# Patient Record
Sex: MALE | Race: BLACK OR AFRICAN AMERICAN | NOT HISPANIC OR LATINO | URBAN - METROPOLITAN AREA
[De-identification: names, ages, dates, MRNs, and addresses within clinical notes are randomized per-mention and may not be internally consistent; named-entity substitution may affect disease eponyms.]

---

## 2017-04-05 ENCOUNTER — EMERGENCY (EMERGENCY)
Facility: HOSPITAL | Age: 37
LOS: 1 days | Discharge: HOME | End: 2017-04-05
Attending: STUDENT IN AN ORGANIZED HEALTH CARE EDUCATION/TRAINING PROGRAM | Admitting: STUDENT IN AN ORGANIZED HEALTH CARE EDUCATION/TRAINING PROGRAM
Payer: COMMERCIAL

## 2017-04-05 VITALS
SYSTOLIC BLOOD PRESSURE: 132 MMHG | OXYGEN SATURATION: 98 % | RESPIRATION RATE: 14 BRPM | HEART RATE: 68 BPM | DIASTOLIC BLOOD PRESSURE: 61 MMHG | TEMPERATURE: 98 F

## 2017-04-05 VITALS
WEIGHT: 184.97 LBS | HEART RATE: 76 BPM | SYSTOLIC BLOOD PRESSURE: 125 MMHG | DIASTOLIC BLOOD PRESSURE: 73 MMHG | OXYGEN SATURATION: 98 % | RESPIRATION RATE: 16 BRPM | HEIGHT: 76 IN | TEMPERATURE: 98 F

## 2017-04-05 DIAGNOSIS — M54.2 CERVICALGIA: ICD-10-CM
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
## 2017-04-05 DIAGNOSIS — M25.511 PAIN IN RIGHT SHOULDER: ICD-10-CM

## 2017-04-05 DIAGNOSIS — F17.210 NICOTINE DEPENDENCE, CIGARETTES, UNCOMPLICATED: ICD-10-CM

## 2017-04-05 DIAGNOSIS — Z98.890 OTHER SPECIFIED POSTPROCEDURAL STATES: Chronic | ICD-10-CM

## 2017-04-05 DIAGNOSIS — M79.651 PAIN IN RIGHT THIGH: ICD-10-CM

## 2017-04-05 PROCEDURE — 72040 X-RAY EXAM NECK SPINE 2-3 VW: CPT | Mod: 26

## 2017-04-05 PROCEDURE — 73552 X-RAY EXAM OF FEMUR 2/>: CPT | Mod: 26,RT

## 2017-04-05 PROCEDURE — 73030 X-RAY EXAM OF SHOULDER: CPT | Mod: 26,RT

## 2017-04-05 PROCEDURE — 73030 X-RAY EXAM OF SHOULDER: CPT

## 2017-04-05 PROCEDURE — 99284 EMERGENCY DEPT VISIT MOD MDM: CPT | Mod: 25

## 2017-04-05 PROCEDURE — 72040 X-RAY EXAM NECK SPINE 2-3 VW: CPT

## 2017-04-05 PROCEDURE — 73552 X-RAY EXAM OF FEMUR 2/>: CPT

## 2017-04-05 RX ORDER — IBUPROFEN 200 MG
600 TABLET ORAL ONCE
Qty: 0 | Refills: 0 | Status: COMPLETED | OUTPATIENT
Start: 2017-04-05 | End: 2017-04-05

## 2017-04-05 RX ADMIN — Medication 600 MILLIGRAM(S): at 02:52

## 2017-04-05 RX ADMIN — Medication 600 MILLIGRAM(S): at 03:37
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
## 2017-04-05 NOTE — ED PROVIDER NOTE - PHYSICAL EXAMINATION
Gait steady, no bruising, deformity to right thigh. Full ROM of right knee, no effusion. Hips and pelvis stable. Full ROM of right shoulder with no tenderness to palpation, swelling, or deformity. Normal rotator cuff function, normal sensation.

## 2017-04-05 NOTE — ED PROVIDER NOTE - PROGRESS NOTE DETAILS
Patient feels well, informed of results of x-rays. Advised rest, NSAIDS, PMD f/u. consider MRI if symptoms persist. Patient expressed understanding. S.O. at bedside to take patient home.

## 2017-04-05 NOTE — ED PROVIDER NOTE - HEAD, MLM
Head is atraumatic. Head shape is symmetrical. Full ROM of c-spine, no deformity, ecchymosis, step-off

## 2017-06-08 ENCOUNTER — EMERGENCY (EMERGENCY)
Facility: HOSPITAL | Age: 37
LOS: 1 days | Discharge: HOME | End: 2017-06-08
Attending: EMERGENCY MEDICINE | Admitting: EMERGENCY MEDICINE
Payer: MEDICAID

## 2017-06-08 VITALS
HEIGHT: 76 IN | SYSTOLIC BLOOD PRESSURE: 131 MMHG | HEART RATE: 67 BPM | RESPIRATION RATE: 16 BRPM | WEIGHT: 186.95 LBS | DIASTOLIC BLOOD PRESSURE: 91 MMHG | OXYGEN SATURATION: 96 % | TEMPERATURE: 98 F

## 2017-06-08 DIAGNOSIS — Z98.890 OTHER SPECIFIED POSTPROCEDURAL STATES: Chronic | ICD-10-CM

## 2017-06-08 DIAGNOSIS — R11.10 VOMITING, UNSPECIFIED: ICD-10-CM

## 2017-06-08 DIAGNOSIS — Z98.890 OTHER SPECIFIED POSTPROCEDURAL STATES: ICD-10-CM

## 2017-06-08 DIAGNOSIS — F17.210 NICOTINE DEPENDENCE, CIGARETTES, UNCOMPLICATED: ICD-10-CM

## 2017-06-08 PROCEDURE — 96372 THER/PROPH/DIAG INJ SC/IM: CPT

## 2017-06-08 PROCEDURE — 99053 MED SERV 10PM-8AM 24 HR FAC: CPT

## 2017-06-08 PROCEDURE — 99284 EMERGENCY DEPT VISIT MOD MDM: CPT | Mod: 25

## 2017-06-08 RX ORDER — ONDANSETRON 8 MG/1
1 TABLET, FILM COATED ORAL
Qty: 15 | Refills: 0
Start: 2017-06-08 | End: 2017-06-13

## 2017-06-08 RX ORDER — MORPHINE SULFATE 50 MG/1
4 CAPSULE, EXTENDED RELEASE ORAL ONCE
Qty: 0 | Refills: 0 | Status: DISCONTINUED | OUTPATIENT
Start: 2017-06-08 | End: 2017-06-08

## 2017-06-08 RX ORDER — ONDANSETRON 8 MG/1
4 TABLET, FILM COATED ORAL ONCE
Qty: 0 | Refills: 0 | Status: COMPLETED | OUTPATIENT
Start: 2017-06-08 | End: 2017-06-08

## 2017-06-08 RX ADMIN — MORPHINE SULFATE 4 MILLIGRAM(S): 50 CAPSULE, EXTENDED RELEASE ORAL at 04:39

## 2017-06-08 RX ADMIN — ONDANSETRON 4 MILLIGRAM(S): 8 TABLET, FILM COATED ORAL at 04:38

## 2017-06-08 RX ADMIN — MORPHINE SULFATE 4 MILLIGRAM(S): 50 CAPSULE, EXTENDED RELEASE ORAL at 05:14

## 2017-06-08 NOTE — ED ADULT NURSE NOTE - OBJECTIVE STATEMENT
Pt is post-op shoulder surgery 12 hrs, c/o shoulder pain and nausea r/t pain medications. Pt states pain medication not strong enough.

## 2017-06-08 NOTE — ED PROVIDER NOTE - OBJECTIVE STATEMENT
38yo male who presents with nausea and vomiting after taking percocet for pain. pt had rotator cuff surgery today and was sent home with percocet but no nausea medication, and is not able to control his pain, no tingling or numbness, no weakness, no fever

## 2017-06-26 NOTE — ED PROVIDER NOTE - CARE PLAN
Principal Discharge DX:	Vomiting in adult patient 67yo Male with large right upper thigh mass, scheduled for excision of mass/cyst.

## 2021-01-05 ENCOUNTER — TRANSCRIPTION ENCOUNTER (OUTPATIENT)
Age: 41
End: 2021-01-05

## 2021-02-12 ENCOUNTER — TRANSCRIPTION ENCOUNTER (OUTPATIENT)
Age: 41
End: 2021-02-12

## 2022-02-11 ENCOUNTER — EMERGENCY (EMERGENCY)
Facility: HOSPITAL | Age: 42
LOS: 1 days | Discharge: HOME | End: 2022-02-11
Attending: EMERGENCY MEDICINE | Admitting: EMERGENCY MEDICINE
Payer: COMMERCIAL

## 2022-02-11 VITALS
WEIGHT: 188.94 LBS | OXYGEN SATURATION: 98 % | HEART RATE: 89 BPM | RESPIRATION RATE: 16 BRPM | DIASTOLIC BLOOD PRESSURE: 58 MMHG | SYSTOLIC BLOOD PRESSURE: 110 MMHG | HEIGHT: 76 IN | TEMPERATURE: 97 F

## 2022-02-11 DIAGNOSIS — Z98.890 OTHER SPECIFIED POSTPROCEDURAL STATES: Chronic | ICD-10-CM

## 2022-02-11 PROCEDURE — 99283 EMERGENCY DEPT VISIT LOW MDM: CPT | Mod: 25

## 2022-02-11 PROCEDURE — 29515 APPLICATION SHORT LEG SPLINT: CPT

## 2022-02-11 PROCEDURE — 99053 MED SERV 10PM-8AM 24 HR FAC: CPT

## 2022-02-11 PROCEDURE — 29515 APPLICATION SHORT LEG SPLINT: CPT | Mod: RT

## 2022-02-11 PROCEDURE — 73630 X-RAY EXAM OF FOOT: CPT

## 2022-02-11 RX ORDER — ACETAMINOPHEN 500 MG
650 TABLET ORAL ONCE
Refills: 0 | Status: COMPLETED | OUTPATIENT
Start: 2022-02-11 | End: 2022-02-11

## 2022-02-11 RX ADMIN — Medication 650 MILLIGRAM(S): at 23:46
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
## 2022-02-11 NOTE — ED PROVIDER NOTE - CARE PROVIDER_API CALL
Nile Lemus ([facility]WELL)  Podiatric Medicine and Surgery  [address] [place] [address]  Phone: [phone]  Fax: [phone]  Follow Up Time:

## 2022-02-11 NOTE — ED PROVIDER NOTE - NSICDXPASTSURGICALHX_GEN_ALL_CORE_FT
PAST SURGICAL HISTORY:  H/O shoulder surgery right    History of back surgery     S/P ACL surgery right

## 2022-02-11 NOTE — ED PROVIDER NOTE - NSFOLLOWUPINSTRUCTIONS_ED_ALL_ED_FT
Crush Injury of the Foot      When a crush injury of the foot occurs, many structures within the foot and ankle can be affected. This can result in a complicated injury that may involve:  •One or more broken (fractured) bones.      •Lacerations or abrasions of the skin. These increase your risk of infection.      •Compressed or torn muscles.      •Torn ligaments and tendons.      •Broken blood vessels, causing bleeding within the tissues. This can lead to dangerously high pressure within the tissues (compartment syndrome).      •Damage to nerves.      •One or more toe amputations.        What are the causes?    This type of injury can happen when a great amount of force is suddenly applied to the foot. This might occur:  •During a motor vehicle accident.      •If a heavy load falls directly onto the foot.      •If the foot is pulled into a machine during industrial or agricultural work.        What are the signs or symptoms?    Symptoms will vary depending on which structures in your foot have been injured. Symptoms may include:  •Moderate or severe pain in the foot, ankle, or leg.      •Bleeding at the site of injury.      •Tingling, numbness, or loss of feeling (sensation) in part or all of your foot.      •Loss of movement in part or all of your foot.        How is this diagnosed?    Your health care provider will examine you and ask questions about how your injury happened. The exam may include checking for sensation and blood flow into your foot. You may also have tests, including X-rays and procedures to check the pressure in your foot.    After initial treatment, additional tests may be done to further diagnose the extent of your injuries. These may include:  •A nerve conduction study to determine how well the nerves are working in your leg and foot.      •An MRI to determine if other injuries occurred that do not usually show up on the X-ray.        How is this treated?    Treatment for this condition depends on the severity of your crush injury. Treatment may include:  •Thorough cleaning if you have an open wound. This may or may not require surgery.      •Having a splint applied to your foot or leg.      •Medicine to relieve pain.      •Antibiotic medicine to prevent infection.      •Stitches (sutures) to close open wounds.      •One or more surgeries to address injuries to skin, bones, joints, tendons, ligaments, muscles, nerves, or blood vessels.        Follow these instructions at home:    If you have a splint:     •Wear the splint as told by your health care provider. Remove it only as told by your health care provider.      •Loosen the splint if your toes tingle, become numb, or turn cold and blue.      •Keep the splint clean.    •If the splint is not waterproof:  •Do not let it get wet.      •Cover it with a watertight covering when you take a bath or shower.          Wound care    •If you have any skin wounds that were covered with bandages (dressings), follow instructions from your health care provider about how to take care of your wound. Make sure you:  •Wash your hands with soap and water before and after you change your dressing. If soap and water are not available, use hand .      •Change your dressing as told by your health care provider.      •Leave stitches (sutures), skin glue, or adhesive strips in place. These skin closures may need to stay in place for 2 weeks or longer. If adhesive strip edges start to loosen and curl up, you may trim the loose edges. Do not remove adhesive strips completely unless your health care provider tells you to do that.      •If you have skin wounds, check them every day for signs of infection. Check for:  •Redness, swelling, or pain.      •Fluid or blood.      •Warmth.      •Pus or a bad smell.          Managing pain, stiffness, and swelling    •If directed, put ice on the injured area.  •Put ice in a plastic bag.      •Place a towel between your skin and the bag.      •Leave the ice on for 20 minutes, 2–3 times a day.        •Raise (elevate) the injured area above the level of your heart while you are sitting or lying down.      Driving     • Do not drive or use heavy machinery while taking prescription pain medicine.      •Ask your health care provider when it is safe to drive if you have a splint on your foot or leg.      Activity     •Return to your normal activities as told by your health care provider. Ask your health care provider what activities are safe for you.      •Work with a physical therapist (PT) or occupational therapist (OT) as told by your health care provider.      General instructions     •Take over-the-counter and prescription medicines only as told by your health care provider.      •If you were prescribed an antibiotic, take it as told by your health care provider. Do not stop taking the antibiotic even if you start to feel better.      • Do not use any products that contain nicotine or tobacco, such as cigarettes, e-cigarettes, and chewing tobacco. These can delay healing. If you need help quitting, ask your health care provider.      •Keep all follow-up visits as told by your health care provider. This is important. These include PT and OT visits.        Contact a health care provider if:    •A wound that was sutured opens up.      •You have redness, swelling, or pain in your foot.      •You have fluid or blood coming from your foot.      •Your foot feels warm to the touch.      •You have pus or a bad smell coming from your foot.      •You have a fever.        Get help right away if:    •You suddenly develop severe pain in your foot.      •You previously had sensation in your foot and you suddenly lose sensation.      •Your symptoms had improved and they suddenly get worse.      •Your foot or toes are turning pink or blue.        Summary    •When a crush injury of the foot occurs, many structures within the foot and ankle can be affected.      •This type of injury can happen when a great amount of force is suddenly applied to the foot.      •Treatment for this condition depends on the severity of your crush injury.      This information is not intended to replace advice given to you by your health care provider. Make sure you discuss any questions you have with your health care provider
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
## 2022-02-11 NOTE — ED PROVIDER NOTE - PATIENT PORTAL LINK FT
You can access the FollowMyHealth Patient Portal offered by [facility] by registering at the following website: [email or web address]. By joining [facility]’s FollowMyHealth portal, you will also be able to view your health information using other applications (apps) compatible with our system.
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
## 2022-02-12 PROCEDURE — 73630 X-RAY EXAM OF FOOT: CPT | Mod: 26,RT

## 2022-06-01 ENCOUNTER — EMERGENCY (EMERGENCY)
Facility: HOSPITAL | Age: 42
LOS: 1 days | Discharge: HOME | End: 2022-06-01
Attending: EMERGENCY MEDICINE | Admitting: EMERGENCY MEDICINE
Payer: COMMERCIAL

## 2022-06-01 VITALS
WEIGHT: 186.95 LBS | SYSTOLIC BLOOD PRESSURE: 112 MMHG | HEIGHT: 76 IN | HEART RATE: 75 BPM | TEMPERATURE: 96 F | DIASTOLIC BLOOD PRESSURE: 68 MMHG | RESPIRATION RATE: 18 BRPM | OXYGEN SATURATION: 98 %

## 2022-06-01 DIAGNOSIS — Z98.890 OTHER SPECIFIED POSTPROCEDURAL STATES: Chronic | ICD-10-CM

## 2022-06-01 PROCEDURE — 99285 EMERGENCY DEPT VISIT HI MDM: CPT

## 2022-06-01 PROCEDURE — 73562 X-RAY EXAM OF KNEE 3: CPT | Mod: 26,RT

## 2022-06-01 PROCEDURE — 70450 CT HEAD/BRAIN W/O DYE: CPT | Mod: MF

## 2022-06-01 PROCEDURE — 72110 X-RAY EXAM L-2 SPINE 4/>VWS: CPT

## 2022-06-01 PROCEDURE — 72110 X-RAY EXAM L-2 SPINE 4/>VWS: CPT | Mod: 26

## 2022-06-01 PROCEDURE — 99284 EMERGENCY DEPT VISIT MOD MDM: CPT | Mod: 25

## 2022-06-01 PROCEDURE — 70450 CT HEAD/BRAIN W/O DYE: CPT | Mod: 26,MF

## 2022-06-01 PROCEDURE — G1004: CPT

## 2022-06-01 PROCEDURE — 73562 X-RAY EXAM OF KNEE 3: CPT

## 2022-06-01 RX ORDER — ACETAMINOPHEN 500 MG
1000 TABLET ORAL ONCE
Refills: 0 | Status: COMPLETED | OUTPATIENT
Start: 2022-06-01 | End: 2022-06-01

## 2022-06-01 RX ADMIN — Medication 1000 MILLIGRAM(S): at 14:09

## 2022-06-01 NOTE — ED PROVIDER NOTE - NEUROLOGICAL, MLM
Alert and oriented, no focal deficits, no motor or sensory deficits. CN II-XII intact. neurovascular intact.

## 2022-06-01 NOTE — ED PROVIDER NOTE - NS ED ATTENDING STATEMENT MOD
This was a shared visit with the COSMO. I reviewed and verified the documentation and independently performed the documented:
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
## 2022-06-01 NOTE — ED PROVIDER NOTE - PATIENT PORTAL LINK FT
You can access the FollowMyHealth Patient Portal offered by [facility] by registering at the following website: [email or web address]. By joining [facility]’s FollowMyHealth portal, you will also be able to view your health information using other applications (apps) compatible with our system.
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
## 2022-06-01 NOTE — ED ADULT NURSE NOTE - BREATH SOUNDS, MLM
Clear Rotation Flap Text: The defect edges were debeveled with a #15 scalpel blade.  Given the location of the defect, shape of the defect and the proximity to free margins a rotation flap was deemed most appropriate.  Using a sterile surgical marker, an appropriate rotation flap was drawn incorporating the defect and placing the expected incisions within the relaxed skin tension lines where possible.    The area thus outlined was incised deep to adipose tissue with a #15 scalpel blade.  The skin margins were undermined to an appropriate distance in all directions utilizing iris scissors.

## 2022-06-01 NOTE — ED PROVIDER NOTE - MUSCULOSKELETAL MINIMAL EXAM
+ TTP right medial knee with ROM. + TTP right upper trapezius and paracervical muscles. + TTP right lumbar paraspinal muscles. no midline tendenress. FROM of all extremities. no chest wall or pelvic tenderness. dp pulses equal and intact bilaterally.

## 2022-06-01 NOTE — ED PROVIDER NOTE - CARE PROVIDER_API CALL
Sabino Mcarthur)  Orthopaedic Surgery  [address] [place] [address]  Phone: [phone]  Fax: [phone]  Follow Up Time: 1-3 Days
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
## 2022-06-01 NOTE — ED ADULT NURSE NOTE - BOWEL SOUNDS RUQ
Spoke with patient. At her visit on 18 with Trinh Warren she was increased to 4 tablets daily. 1 tablet in the morning, 2 at 4pm and 1 tablet at midnight.     Reviewed PDMP: Consistent    Per Trinh Warren OV 18:  Patient Instructions   Start today at 4 PM and take 2 of the long acting Morphine SR 15 mg  Continue taking one at 8 AM and 1 at Midnight.       Oxycodone w/ Acetaminophen  5-325MG / Tablet  Rx# [id number] Opioid Qty: 82  Days: 20  Refills: 0 Prescribed: 2018  Dispensed: [address], [id number] [id number]  [address] [facility] [address] [address]  : 1978 [address], [id number]  Pay Type: Medicare Morphine Sulfate  15MG / Tablet Extended Release  Rx# [id number] Opioid Qty: 84  Days: 28  Refills: 0 Prescribed: 2018  Dispensed: [address], [id number] [id number]  [address] [facility] [address] [address]  : 1978 [address], [id number]  Pay Type: Medicare     present

## 2023-01-23 ENCOUNTER — NON-APPOINTMENT (OUTPATIENT)
Age: 43
End: 2023-01-23

## 2023-01-23 ENCOUNTER — APPOINTMENT (OUTPATIENT)
Dept: ORTHOPEDIC SURGERY | Facility: CLINIC | Age: 43
End: 2023-01-23
Payer: MEDICAID

## 2023-01-23 DIAGNOSIS — S97.122A: ICD-10-CM

## 2023-01-23 PROCEDURE — 99203 OFFICE O/P NEW LOW 30 MIN: CPT

## 2023-01-23 NOTE — DISCUSSION/SUMMARY
[de-identified] : At this time being that the injury was over 1 month ago and the patient still has swelling and pain and unable to move his small toe, I would like to go ahead and order an MRI of the left foot without contrast to evaluate the small toe, tendons and for a possible occult fracture from the injuries.  In the interim the patient will continue to wear proper shoes that are protective to the foot and toe.  He will use Tylenol as needed for pain.  Once the MRI is completed, I will call the patient with the results.  All of his questions were answered and he understood the treatment course.

## 2023-01-23 NOTE — HISTORY OF PRESENT ILLNESS
[FreeTextEntry1] : The patient is a 42-year-old male who presents with a small toe injury which he sustained 1 month ago after he stubbed his toe and then dropped a heavy object on the toe a few days apart.  He presents with a swollen pinky toe and is having trouble moving his pinky toe.  He is walking with a slight limp with regular sneakers due to the pain of the pinky toe.  He denies fevers, chills, night's, shortness of breath, calf pain.  He has no other complaints.

## 2023-02-07 ENCOUNTER — APPOINTMENT (OUTPATIENT)
Dept: MRI IMAGING | Facility: CLINIC | Age: 43
End: 2023-02-07

## 2023-06-08 NOTE — PHYSICAL EXAM
Problem: Fall Injury Risk  Goal: Absence of Fall and Fall-Related Injury  Outcome: Ongoing, Progressing     Problem: Fall Injury Risk  Goal: Absence of Fall and Fall-Related Injury  Outcome: Ongoing, Progressing     Problem: Skin Injury Risk Increased  Goal: Skin Health and Integrity  Outcome: Ongoing, Progressing     Problem: Skin Injury Risk Increased  Goal: Skin Health and Integrity  Outcome: Ongoing, Progressing      [de-identified] : Left foot Physical Examination:\par \par General: Alert and oriented x3.  In no acute distress.  Pleasant in nature with a normal affect.  No apparent respiratory distress. \par Erythema, Warmth, Rubor: Negative\par Swelling: Positive swelling of the lesser toe.\par \par ROM Ankle:\par 1. Dorsiflexion: 10 degrees\par 2. Plantarflexion: 40 degrees\par 3. Inversion: 30 degrees\par 4. Eversion: 30 degrees\par \par ROM of digits: Normal\par \par Pes Planus: Negative\par Pes Cavus: Negative\par \par Bunion: Negative\par Josefina's Bunion (Bunionette): Negative\par Hammer Toe Deformity/Deformities: Negative\par \par Tenderness to Palpation: \par 1. Heel Pain: Negative\par 2. Midfoot Pain: Negative\par 3. First MTP Joint: Negative\par 4. Lis Franc Joint: Negative\par \par Tenderness Metatarsals:\par 1st MT: Negative\par 2nd MT: Negative\par 3rd MT: Negative\par 4th MT: Negative\par 5th MT: Negative\par Base of the 5th MT: Negative\par \par *Patient has pain and tenderness when palpating the small toe specifically the proximal and distal phalanx.  Patient does have a tailor's bunion noted of the foot.\par \par Ligament Pain:\par 1. Lis Franc Ligament: Negative\par 2. Plantar Fascia Ligament: Negative\par \par Strength: \par 5/5 TA/GS/EHL/FHL/EDL/ADD/ABD\par \par Pulses: 2+ DP/PT Pulses\par \par Capillary Refill Toes: <2 seconds\par \par Neuro: Intact motor and sensory throughout\par \par Additional Test:\par 1. Krishnan's Squeeze Test: Negative\par 2. Calcaneal Squeeze Test: Negative [de-identified] : Office Location: [address] [place] [address], [address]\par Office Phone: [phone]\par Office Fax: [phone]\par PATIENT NAME: Michi Jung\par PATIENT PHONE NUMBER: [phone]\par PATIENT ID: [id number]\par : 1980\par DATE OF EXAM: 2022\par R. Phys. Name: Sylvia Mmcullen\par R. Phys. Address: [address] [place] [address]\par R. Phys. Phone: [phone]\par EXAM: LEFT FOOT XRAY COMPLETE\par \par HISTORY: M79.672 Left foot pain S90.32XD Contusion of left foot,\par subsequent encounter R20.0 Numbness Lower/Upper Extremity\par M25.675 Left foot/toes stiffness\par Draft box on foot at work with pain over small toe.\par \par COMPARISON: None.\par \par TECHNIQUE: AP, lateral, and oblique views of the left foot.\par \par FINDINGS: Normal bone density. No fracture or dislocation. Joint spaces are\par within normal limits. Overlying soft tissues are unremarkable.\par \par IMPRESSION:\par \par \par \par No acute osseous abnormality.\par \par \par \par \par \par Signed by: Toney Samuel MD\par Signed Date: 2022 7:04 PM EST\par \par \par \par SIGNED BY: Toney Samuel M.D., Ext. 2250 2022 07:04 PM\par \par IMPRESSION:\par \par \par \par No acute osseous abnormality.\par \par \par \par \par \par Signed by: Toney Samuel MD\par Signed Date: 2022 7:04 PM EST\par \par \par \par SIGNED BY: Toney Samuel M.D., Ext. 2250 2022 07:04 PM\par

## 2023-09-19 NOTE — ED ADULT NURSE NOTE - PRIVACY CONCERNS
09/19/23 1543   Reason for Consult   Reason for Consult Bedside activities     Writer introduced services and provided bedside activities.  No further needs at time of check in, will follow up as needed.     Ni Bunch MS, CCLS  Child Life Department - PED      
No

## 2024-01-17 ENCOUNTER — APPOINTMENT (OUTPATIENT)
Dept: ORTHOPEDIC SURGERY | Facility: CLINIC | Age: 44
End: 2024-01-17

## 2024-04-16 NOTE — ED ADULT NURSE NOTE - NS PRO AD BILL OF RIGHTS
Refill Request     CONFIRM preferred pharmacy with the patient.    If Mail Order Rx - Pend for 90 day refill.      Last Seen: Last Seen Department: 8/31/2023  Last Seen by PCP: 8/31/2023    Last Written: 06/21/2023 90 tab 3 refills     If no future appointment scheduled:  Review the last OV with PCP and review information for follow-up visit,  Route STAFF MESSAGE with patient name to the  Pool for scheduling with the following information:            -  Timing of next visit           -  Visit type ie Physical, OV, etc           -  Diagnoses/Reason ie. COPD, HTN - Do not use MEDICATION, Follow-up or CHECK UP - Give reason for visit      Next Appointment:   Future Appointments   Date Time Provider Department Center   5/17/2024  3:30 PM Carrie Nunez, [facility] - CNP [facility]       Message sent to  to schedule appt with patient?  NO      Requested Prescriptions     Pending Prescriptions Disp Refills    metoprolol succinate (TOPROL XL) 100 MG extended release tablet 90 tablet 3     Sig: Take 1 tablet by mouth daily       
Yes

## 2024-09-03 NOTE — ED ADULT NURSE NOTE - PERIPHERAL PULSES
Patient to be discharged home.  Continue full dose aspirin 325 mg daily.  Follow with neurology and cardiology as scheduled.  Return to emergency department for any worsening symptoms.  
equal bilaterally